# Patient Record
Sex: FEMALE | Race: WHITE | NOT HISPANIC OR LATINO
[De-identification: names, ages, dates, MRNs, and addresses within clinical notes are randomized per-mention and may not be internally consistent; named-entity substitution may affect disease eponyms.]

---

## 2021-02-12 PROBLEM — Z00.00 ENCOUNTER FOR PREVENTIVE HEALTH EXAMINATION: Status: ACTIVE | Noted: 2021-02-12

## 2021-03-03 ENCOUNTER — APPOINTMENT (OUTPATIENT)
Dept: PHYSICAL MEDICINE AND REHAB | Facility: CLINIC | Age: 66
End: 2021-03-03
Payer: MEDICARE

## 2021-03-03 VITALS
HEIGHT: 66.14 IN | SYSTOLIC BLOOD PRESSURE: 125 MMHG | TEMPERATURE: 97.6 F | HEART RATE: 73 BPM | DIASTOLIC BLOOD PRESSURE: 75 MMHG | WEIGHT: 138 LBS | BODY MASS INDEX: 22.18 KG/M2 | RESPIRATION RATE: 18 BRPM

## 2021-03-03 DIAGNOSIS — Z87.39 PERSONAL HISTORY OF OTHER DISEASES OF THE MUSCULOSKELETAL SYSTEM AND CONNECTIVE TISSUE: ICD-10-CM

## 2021-03-03 DIAGNOSIS — Z85.07 PERSONAL HISTORY OF MALIGNANT NEOPLASM OF PANCREAS: ICD-10-CM

## 2021-03-03 PROCEDURE — 99204 OFFICE O/P NEW MOD 45 MIN: CPT

## 2021-03-03 PROCEDURE — 99072 ADDL SUPL MATRL&STAF TM PHE: CPT

## 2021-03-03 RX ORDER — OMEPRAZOLE, SODIUM BICARBONATE 40; 1100 MG/1; MG/1
CAPSULE ORAL
Refills: 0 | Status: ACTIVE | COMMUNITY

## 2021-03-03 RX ORDER — MAGNESIUM 100 MG
100 TABLET ORAL
Refills: 0 | Status: ACTIVE | COMMUNITY

## 2021-03-03 RX ORDER — PITAVASTATIN CALCIUM 4.18 MG/1
TABLET, FILM COATED ORAL
Refills: 0 | Status: ACTIVE | COMMUNITY

## 2021-03-03 NOTE — HISTORY OF PRESENT ILLNESS
[FreeTextEntry1] : Ms. BRISA BOATENG is a very pleasant 66 year female who seen for evaluation of bilateral foot numbness and paresthesias with balance issues  that has been ongoing for 6 years   without any specific injury or inciting event first noted after a left foot/ankle  fracture . 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 The pain is rated as 1/10 during today's visit, and ranges from 0-8/10. The patient's symptoms are aggravated by always the same   and alleviated by medication  . The patient denies any hand pain -well a little if she sleeps on her hands , no weakness, or bowel/bladder dysfunction. The patient has no other complaints at this time.\par she has not fallen recently \par \par \par

## 2021-03-03 NOTE — REVIEW OF SYSTEMS
[Recent Change In Weight] : ~T recent weight change [Muscle Weakness] : muscle weakness [Difficulty Walking] : difficulty walking [Negative] : Heme/Lymph [Lower Ext Edema] : no lower extremity edema

## 2021-03-03 NOTE — PHYSICAL EXAM
[FreeTextEntry1] : PHYSICAL EXAM : OBJECTIVE \par \par GENERAL : Awake ,alert and oriented to time place and person \par HEAD : normocephalic and atraumatic \par NECK : supple ,no tracheal deviation ,no thyroid enlargement noted with swallowing\par EYES : sclera and conjunctiva normal no redness,intact extraocular movements \par ENT  : ears and nose normal in appearance -hearing adequate \par PULMONARY: effort normal. No respiratory distress. breathing regular. No wheezes \par LYMPH : No swelling in limbs, capillary return within normal range \par CVS : warm extremities,no palpitations,not short of breath, no visible jugular venous distention\par PSYCH : mood and affect normal ,good eye contact ,normal attention \par ABDOMEN : no visible distension , \par NEUROLOGICAL:cranial nerves intact,muscle tone normal,gait and balance safe except where noted below \par SKIN : warm and dry No rash detected over specific body areas examined \par MUSCULOSKELETAL: normal muscle bulk, no focal bony tenderness /posture normal except where specified below\par  wide based gait \par left EHL 4/5 \par cannot tandem walk \par wide based \par numb feet \par loss vibration \par ankle ROM full \par

## 2021-03-03 NOTE — ASSESSMENT
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 on history \par \par \par Recommend\par .symptomatic care and support\par  medications cont pain meds consider gabapentin \par  imaging not needed \par \par \par  relative rest and avoidance of painful activity where possible \par  increasing activity as discussed \par  return for EMG\par Information given to patient about EMG and Nerve Conduction Study Examination including  planning, differential diagnosis to rule in /rule out ,duration of the test ,precautions (if patient on blood thinner.has bleeding disorder or  pace maker device etc -still possible to undergo with care), side effects(benign-limited to short term bruising and discomfort/pain)  \par The protocol of temp checks upon arrival ,disinfection procedure of waiting room and the lab explained- reassured. \par All questions answered. \par Patient instructed to book appointment upon conclusion of appointment\par \par Information sheet ' Answers to your Questions on EMG " forwarded to patient to read prior to testing, with further information about training,background and the procedure itself .\par \par referring doctor will be called after EMG NCS to discuss findings and further management directions \par \par Time spent including review of documents /records/decision making /discussion  amounted  > 45 minutes \par she is anxious and has gone thru a lot with the cancer diagnosis and Rx \par wants to get EMG as soon as possible \par was disappointed that could not have same day \par explained covid protocol and AANEM guidelines for current testing

## 2021-03-04 ENCOUNTER — APPOINTMENT (OUTPATIENT)
Dept: PHYSICAL MEDICINE AND REHAB | Facility: CLINIC | Age: 66
End: 2021-03-04

## 2021-03-11 ENCOUNTER — APPOINTMENT (OUTPATIENT)
Dept: PHYSICAL MEDICINE AND REHAB | Facility: CLINIC | Age: 66
End: 2021-03-11
Payer: MEDICARE

## 2021-03-11 VITALS
HEIGHT: 66 IN | WEIGHT: 138 LBS | DIASTOLIC BLOOD PRESSURE: 70 MMHG | BODY MASS INDEX: 22.18 KG/M2 | SYSTOLIC BLOOD PRESSURE: 116 MMHG | HEART RATE: 74 BPM | TEMPERATURE: 98 F | RESPIRATION RATE: 17 BRPM

## 2021-03-11 DIAGNOSIS — R26.89 OTHER ABNORMALITIES OF GAIT AND MOBILITY: ICD-10-CM

## 2021-03-11 DIAGNOSIS — R29.818 OTHER SYMPTOMS AND SIGNS INVOLVING THE NERVOUS SYSTEM: ICD-10-CM

## 2021-03-11 DIAGNOSIS — R20.0 ANESTHESIA OF SKIN: ICD-10-CM

## 2021-03-11 DIAGNOSIS — R29.898 OTHER SYMPTOMS AND SIGNS INVOLVING THE MUSCULOSKELETAL SYSTEM: ICD-10-CM

## 2021-03-11 DIAGNOSIS — G60.8 OTHER HEREDITARY AND IDIOPATHIC NEUROPATHIES: ICD-10-CM

## 2021-03-11 DIAGNOSIS — Z91.81 HISTORY OF FALLING: ICD-10-CM

## 2021-03-11 DIAGNOSIS — R26.9 UNSPECIFIED ABNORMALITIES OF GAIT AND MOBILITY: ICD-10-CM

## 2021-03-11 DIAGNOSIS — G57.93 UNSPECIFIED MONONEUROPATHY OF BILATERAL LOWER LIMBS: ICD-10-CM

## 2021-03-11 PROCEDURE — 99072 ADDL SUPL MATRL&STAF TM PHE: CPT

## 2021-03-11 PROCEDURE — 95913 NRV CNDJ TEST 13/> STUDIES: CPT

## 2021-03-11 PROCEDURE — 95886 MUSC TEST DONE W/N TEST COMP: CPT

## 2023-04-06 ENCOUNTER — APPOINTMENT (OUTPATIENT)
Dept: UROLOGY | Facility: CLINIC | Age: 68
End: 2023-04-06
Payer: MEDICARE

## 2023-04-06 VITALS
SYSTOLIC BLOOD PRESSURE: 102 MMHG | HEIGHT: 66.14 IN | DIASTOLIC BLOOD PRESSURE: 99 MMHG | WEIGHT: 134 LBS | HEART RATE: 75 BPM | TEMPERATURE: 98.1 F | BODY MASS INDEX: 21.53 KG/M2 | OXYGEN SATURATION: 97 %

## 2023-04-06 PROCEDURE — 99204 OFFICE O/P NEW MOD 45 MIN: CPT

## 2023-04-10 ENCOUNTER — NON-APPOINTMENT (OUTPATIENT)
Age: 68
End: 2023-04-10

## 2023-04-13 LAB
APPEARANCE: ABNORMAL
BACTERIA UR CULT: ABNORMAL
BACTERIA: ABNORMAL /HPF
BILIRUBIN URINE: NEGATIVE
BLOOD URINE: ABNORMAL
CAST: 3 /LPF
COLOR: YELLOW
EPITHELIAL CELLS: 0 /HPF
GLUCOSE QUALITATIVE U: NEGATIVE MG/DL
KETONES URINE: NEGATIVE MG/DL
LEUKOCYTE ESTERASE URINE: ABNORMAL
MICROSCOPIC-UA: NORMAL
NITRITE URINE: NEGATIVE
PH URINE: 5
PROTEIN URINE: NORMAL MG/DL
RED BLOOD CELLS URINE: 0 /HPF
SPECIFIC GRAVITY URINE: 1.01
UROBILINOGEN URINE: 0.2 MG/DL
WHITE BLOOD CELLS URINE: 151 /HPF

## 2023-04-16 NOTE — HISTORY OF PRESENT ILLNESS
[FreeTextEntry1] : 69 yo F here for recurrent UTI - 3 times since June\par Per pt, cultures show e.coli\par UTI symptoms - dysuria, no bothersome LUTS\par Most recently had symptoms 2 days ago but resolved on its own yesterday\par Started on abx by PCP but pt never picked it up\par History of pelvic organ prolapse and using pessary - managed by outside urologist (Dr. Laly Doll)\par Pt states dysuria was painful so removed pessary a few days ago (currently at home).\par Per pt, s/p prolapse surgery in 2019 but recurred after 3 days\par Voids 1/day, nocturia 4-5/night\par Drinks 2L of water, 1 cup of coffee, 1cup of black tea\par normal bowel movements

## 2023-04-16 NOTE — ASSESSMENT
[FreeTextEntry1] : 69 yo F with prolapse managed with pessary, recurrent UTI\par \par - PVR = 50ml\par - UA, culture\par - discussed possible etiologies for UTI. Spent extensive period of time discussed behavioral modification including adequate hydration, cutting back on caffeine intake, timed voiding during the day, importance of controlling any diabetes and chronic constipation and how all of these things could potentially increase risk for persistent or recurrent UTI. Emphasized timed voiding, as pt is only voiding once daily\par - Refer to Dr. Preciado for pessary/prolapse management which may be contirbuting to her UTIs\par

## 2023-04-25 ENCOUNTER — APPOINTMENT (OUTPATIENT)
Dept: UROLOGY | Facility: CLINIC | Age: 68
End: 2023-04-25
Payer: MEDICARE

## 2023-04-25 VITALS
SYSTOLIC BLOOD PRESSURE: 117 MMHG | BODY MASS INDEX: 21.53 KG/M2 | TEMPERATURE: 97.4 F | WEIGHT: 134 LBS | HEART RATE: 78 BPM | HEIGHT: 66.14 IN | DIASTOLIC BLOOD PRESSURE: 56 MMHG | OXYGEN SATURATION: 96 %

## 2023-04-25 PROCEDURE — 51798 US URINE CAPACITY MEASURE: CPT

## 2023-04-25 PROCEDURE — 99215 OFFICE O/P EST HI 40 MIN: CPT

## 2023-04-25 RX ORDER — METHENAMINE HIPPURATE 1 G/1
1 TABLET ORAL
Qty: 60 | Refills: 3 | Status: ACTIVE | COMMUNITY
Start: 2023-04-25 | End: 1900-01-01

## 2023-04-25 RX ORDER — ASCORBIC ACID 500 MG
500 TABLET ORAL DAILY
Qty: 30 | Refills: 3 | Status: ACTIVE | COMMUNITY
Start: 2023-04-25 | End: 1900-01-01

## 2023-04-25 NOTE — ASSESSMENT
[FreeTextEntry1] : Ms. BOATENG has seen me today for an evaluation of recurrent urinary tract infections (UTIs).  These have been confirmed on cultures that I do not have to review.  Additionally, she suffers from prolapse that is managed with a pessary and Estrace cream.  She cleans and manages the pessary independently.  Today she feels well without any urinary complaints.  Her PVR is 7, indicating she is emptying well.\par \par Based on her exam and clinical picture, we have discussed that in her age group, these infections are likely due to vaginal atrophy, which poses an increased risk of these infections continuing in the future. In addition, we covered the importance of a non-antibiotic based regimen to control UTI symptoms in order to prevent overtreatment and minimize the risk of antibiotic resistance. Moving forward, our plan to prevent future infections includes: \par \par       -Vaginal estrogen cream - instructions on use, risks, benefits, and alternatives discussed and provided in a handout \par       -Hiprex + Vitamin C\par       -Cranberry supplements \par       -D-Mannose \par       -Stool softeners \par       -Probiotics \par       -Cystoscopy  \par \par In the event of acute UTI symptoms, she has been advised to: \par       -increase fluid intake \par       -use OTC Motrin and Azo for pain control \par       -call my office for a urine culture if symptoms do not improve after 3 days \par \par I will treat based on positive urine culture results only in the presence of new symptoms\par \par I would like to see her back in 1 month for a cystoscopy

## 2023-04-25 NOTE — PHYSICAL EXAM
[General Appearance - Well Developed] : well developed [General Appearance - Well Nourished] : well nourished [FreeTextEntry1] : ambulates with walker

## 2023-04-25 NOTE — HISTORY OF PRESENT ILLNESS
[FreeTextEntry1] : 68F presents for initial evaluation of recurrent UTIs\par    Referred by Dr. Hernandez   \par \par    PMH significant for: pancreatic cancer, neuropathy, arthritis \par    PSH significant for: pancreatic surgery, cholecystectomy, hernia repair \par    Significant meds: livalo, omeprazole    \par \par    Seen by Dr. Hernandez 4/6 for recurrent UTIs and prolapse   Per patient, 3 abx since June 2022   Prolapse history- using pessary with previous MD   Removed pessary due to pain in beginning of April    UCx 4/6: +Ecoli—taking abx prescribed by PCP \par \par Patient reports years-long history of prolapse symptoms\par Reports significant level of distress \par Pelvic Heaviness: yes\par Vaginal Bulge: yes\par Splinting: No\par Previously treated with: treated with sacrospinous ligament fixation?? however 3 days after surgery prolapse recurred and now she has pessary.\par History of Abnormal PAP: none \par Amenable to Hysterectomy: patient doesn't want her uterus out\par \par Patient reports few years history of recurrent UTI that started after her surgery in 2019\par Reports significant level of distress \par Associated with frequency, and burning \par Previously treated with antibiotics last course was on April 10 fo E.coli previously has been treated with nitrofurantoin \par \par Daytime Frequency: 2-3\par Nighttime Frequency: 4-5 \par Patient reports no urgency \par Pads per day: none\par Straining to void: no\par Subjective Incomplete Emptying: yes \par patient had 3 UTI in the past 4 months \par \par Daily Fluid Total: drinks around 2 liters of water \par Daily Caffeine Total: 1 cup of coffee\par  \par patient reports no history of constipation \par Neurologic Hx, Vision or Balance changes: patient use walker for assistance in walking \par

## 2023-05-25 ENCOUNTER — APPOINTMENT (OUTPATIENT)
Dept: UROLOGY | Facility: CLINIC | Age: 68
End: 2023-05-25

## 2023-07-03 ENCOUNTER — APPOINTMENT (OUTPATIENT)
Dept: UROLOGY | Facility: CLINIC | Age: 68
End: 2023-07-03
Payer: MEDICARE

## 2023-07-03 DIAGNOSIS — R35.1 NOCTURIA: ICD-10-CM

## 2023-07-03 DIAGNOSIS — N94.9 UNSPECIFIED CONDITION ASSOCIATED WITH FEMALE GENITAL ORGANS AND MENSTRUAL CYCLE: ICD-10-CM

## 2023-07-03 LAB
BILIRUB UR QL STRIP: NORMAL
CLARITY UR: CLEAR
COLLECTION METHOD: NORMAL
GLUCOSE UR-MCNC: NORMAL
HCG UR QL: 0.2 EU/DL
HGB UR QL STRIP.AUTO: NORMAL
KETONES UR-MCNC: NORMAL
LEUKOCYTE ESTERASE UR QL STRIP: NORMAL
NITRITE UR QL STRIP: NORMAL
PH UR STRIP: 6
PROT UR STRIP-MCNC: NORMAL
SP GR UR STRIP: 1.02

## 2023-07-03 PROCEDURE — 81003 URINALYSIS AUTO W/O SCOPE: CPT | Mod: QW

## 2023-07-03 PROCEDURE — 99215 OFFICE O/P EST HI 40 MIN: CPT

## 2023-07-05 ENCOUNTER — APPOINTMENT (OUTPATIENT)
Dept: UROLOGY | Facility: CLINIC | Age: 68
End: 2023-07-05
Payer: MEDICARE

## 2023-07-05 VITALS
SYSTOLIC BLOOD PRESSURE: 109 MMHG | OXYGEN SATURATION: 97 % | DIASTOLIC BLOOD PRESSURE: 62 MMHG | HEART RATE: 62 BPM | TEMPERATURE: 97.3 F

## 2023-07-05 LAB
APPEARANCE: CLEAR
BACTERIA: ABNORMAL /HPF
BILIRUBIN URINE: NEGATIVE
BLOOD URINE: NEGATIVE
CAST: 1 /LPF
COLOR: YELLOW
EPITHELIAL CELLS: 4 /HPF
GLUCOSE QUALITATIVE U: NEGATIVE MG/DL
KETONES URINE: NEGATIVE MG/DL
LEUKOCYTE ESTERASE URINE: ABNORMAL
MICROSCOPIC-UA: NORMAL
NITRITE URINE: POSITIVE
PH URINE: 6
PROTEIN URINE: NEGATIVE MG/DL
RED BLOOD CELLS URINE: 1 /HPF
SPECIFIC GRAVITY URINE: 1.02
UROBILINOGEN URINE: 0.2 MG/DL
WHITE BLOOD CELLS URINE: 40 /HPF

## 2023-07-05 PROCEDURE — 51798 US URINE CAPACITY MEASURE: CPT

## 2023-07-05 PROCEDURE — 99214 OFFICE O/P EST MOD 30 MIN: CPT

## 2023-07-05 PROCEDURE — 81003 URINALYSIS AUTO W/O SCOPE: CPT | Mod: QW

## 2023-07-05 NOTE — HISTORY OF PRESENT ILLNESS
[FreeTextEntry1] : Language: English/Polish\par Date of First visit: 7/3/2023\par Accompanied by: ***\par Contact info: ***\par Referring Provider/PCP: Brown Samuels\par \par \par \par CC/ Problem List:\par \par ===============================================================================\par FIRST VISIT:\par The patient is a 68 year female who first presents 2023 for prolapse and recurrent UTI's.\par \par She has been on five antibiotics. It is very very difficult to get a history from her. \par She states a Dr. Reynoso did her surgery and her prolapse recurred three days later while she was rolling over in bed and she has been "suffering every since." She saw Dr. Hernandez who sent her to Dr. Preciado but she does not want to see a male. She also may have a uterine issue.\par \par She got her first UTI around . She had her prolapse surgery in 2019.\par She was told that her prolapse has caused her UTI's.\par She denies sensation of prolapse. She has a pessary in. She cleans it once a month. She has been on abx 6x and has only had one positive culture.\par \par She has baseline nocturia 2-5x/night. She drinks 3L of water per night. She denies straining to void. She denies constipation. She is not sexually active. She was told she had fungus but she is not sure where.\par \par When she has a UTI she feels pain when she voids. She does not usually have urgency or frequency. She does not know where her pain is but she feels pain outside. I reviewed all the records she brought in. See below\par \par \par -------------------------------------------------------------------------------------------\par INTERVAL VISITS:\par \par \par ===============================================================================\par \par PMH: Thickened endometrium\par PSH: prolapse surgery in 2019, Hernia x2, Pancreatic cancer, Noni\par POBH: (if applicable) \par FH: \par \par ALL: PCN\par MEDS: Premarin, Probiotic, Dmannose, Emergen-C\par SOC:\par \par \par ROS: Review of Systems is as per HPI unless otherwise denoted below\par \par \par ===============================================================================\par DATA: \par \par LABS:-------------------------------------------------------------------------------------------------------------------\par 2022: No urine tests \par 2023: Urine culture >100K e. coli pan-sens\par 23 sCre 0.84, urine culture negative, UA 2 Epi, Small Le, neg NIT\par 2023: sCre 0.62, UA negative, NIT, Neg LE\par 7/3/2023: UA dip small Le, trace blood lysed\par \par \par RADS:-------------------------------------------------------------------------------------------------------------------\par 2023: Pelvic sono\par Thickened endometrium with cystic changes\par \par \par PATHOLOGY/CYTOLOGY:-------------------------------------------------------------------------------------------\par \par \par \par VOIDING STUDIES: ----------------------------------------------------------------------------------------------------\par 7/3/2023: PVR 15cc\par \par \par STONE STUDIES: (Analysis/LLSA)----------------------------------------------------------------------------------\par \par \par \par PROCEDURES: -----------------------------------------------------------------------------------------------\par \par \par \par \par ===============================================================================\par \par PHYSICAL EXAM:\par \par GEN: AAOx3, NAD, Habitus: normal\par \par BARRIERS to CARE: none\par \par PSYCH: Appropriate Behavior, Affect Congruent\par \par HEENT: AT/NC Trachea midline. \par \par Lungs: No labored breathing.\par \par NEURO: + Movement, all 4 extremities grossly intact without deficits. No tremors.\par \par SKIN: Warm dry. No visible rashes or ulcers\par \par GAIT: Gait antalgic , Stability guarded uses walker\par \par =======================================================================================\par \par \par \par ASSESSMENT and PLAN\par \par The patient is a 68 year female with a history of the following:\par \par 1. Prolapse with Pessary\par I am going to refer her to Dr. Jernigan for prolapse. She started having some of her pain after her POP repair and I think that Dr. rPeciado's plan of cysto or other workup is a good idea.\par \par 2. Atrophic vaginitis with endometrial changes\par I recommended she stop the premarin because of her endometrial changes: if these are benign she can restart it.\par \par 3. Recurrent UTI's\par I don't think she is having r-UTI's. She has only had on positive culture. Her other urine tests have been negative.\par Her symptoms are also not typical of UTI's (outside burning, but frequency/urgency). I recommended she stop cranberry/Vit C for now as these can contributed to dysuria.\par \par -----------------------------------------------------------------------------------------------------\par LABS/TESTS Ordered: UCx, UA\par Meds Ordered: stop premarin, cranberry, Vit C\par Follow up: Dr. Jernigan for prolapse\par -----------------------------------------------------------------------------------------------------\par \par The total amount of time I have personally spent preparing for this visit, reviewing the patient's test results, obtaining external history, ordering tests/medications, documenting clinical information, communicating with and counseling the patient/family and/or caregiver(s), and spent face to face with the patient explaining the above was  40 minutes.\par \par Thank you for allowing me to assist in the care of your patient. Should you have any questions please do not hesitate to reach out to me.\par \par \par Magdalene Schreiber MD\par Associate \par Department of Urology\par [facility]\par Phone: [phone]\par Fax: [phone]\par \par [address]\par [facility] [address]\par

## 2023-07-06 NOTE — HISTORY OF PRESENT ILLNESS
[FreeTextEntry1] : Pt is a 67 yo with recurrent UTIs and prolapse sent by Dr. Enriquez.   Her chief complaint is burning after she urinates  and pain at the clitoris.  She has recently stopped using transvaginal estrogen as advised by Dr. Schreiber as she has a thickened endometrium.   She is having a repeat transvaginal US next week and will send the report to me.  I agree with holding on estrogen.  She wants to take D Mannose to help with her symptoms- that is fine. \par \par PVR 55cc, (done to rule out incomplete emptying)\par \par From Dr. Schreiber note: \par Full Hx.\par \par She has been on five antibiotics. It is very very difficult to get a history from her. \par She states a Dr. Reynoso did her surgery and her prolapse recurred three days later while she was rolling over in bed and she has been "suffering every since." She saw Dr. Hernandez who sent her to Dr. Preciado but she does not want to see a male. She also may have a uterine issue.\par \par She got her first UTI around . She had her prolapse surgery in 2019.\par She was told that her prolapse has caused her UTI's.\par She denies sensation of prolapse. She has a pessary in. She cleans it once a month. She has been on abx 6x and has only had one positive culture.\par \par She has baseline nocturia 2-5x/night. She drinks 3L of water per night. She denies straining to void. She denies constipation. She is not sexually active. She was told she had fungus but she is not sure where.\par \par When she has a UTI she feels pain when she voids. She does not usually have urgency or frequency. She does not know where her pain is but she feels pain outside. I reviewed all the records she brought in. See below\par \par \par -------------------------------------------------------------------------------------------\par INTERVAL VISITS:\par \par \par ===============================================================================\par \par PMH: Thickened endometrium\par PSH: prolapse surgery in 2019, Hernia x2, Pancreatic cancer, Noni\par POBH: (if applicable) \par FH: \par \par ALL: PCN\par MEDS: Premarin, Probiotic, Dmannose, Emergen-C\par SOC:\par \par \par ROS: Review of Systems is as per HPI unless otherwise denoted below\par \par \par ===============================================================================\par DATA: \par \par LABS:-------------------------------------------------------------------------------------------------------------------\par 2022: No urine tests \par 2023: Urine culture >100K e. coli pan-sens\par 23 sCre 0.84, urine culture negative, UA 2 Epi, Small Le, neg NIT\par 2023: sCre 0.62, UA negative, NIT, Neg LE\par 7/3/2023: UA dip small Le, trace blood lysed\par \par \par RADS:-------------------------------------------------------------------------------------------------------------------\par 2023: Pelvic sono\par Thickened endometrium with cystic changes\par \par \par PATHOLOGY/CYTOLOGY:-------------------------------------------------------------------------------------------\par \par \par \par VOIDING STUDIES: ----------------------------------------------------------------------------------------------------\par 7/3/2023: PVR 15cc\par \par \par STONE STUDIES: (Analysis/LLSA)----------------------------------------------------------------------------------\par \par \par \par PROCEDURES: -----------------------------------------------------------------------------------------------\par \par \par \par \par ===============================================================================\par \par PHYSICAL EXAM:\par \par GEN: AAOx3, NAD, Habitus: normal\par \par BARRIERS to CARE: none\par \par PSYCH: Appropriate Behavior, Affect Congruent\par \par HEENT: AT/NC Trachea midline. \par \par Lungs: No labored breathing.\par \par NEURO: + Movement, all 4 extremities grossly intact without deficits. No tremors.\par \par SKIN: Warm dry. No visible rashes or ulcers\par \par GAIT: Gait antalgic , Stability guarded uses walker\par \par =======================================================================================\par \par \par \par ASSESSMENT and PLAN\par \par The patient is a 68 year female with a history of prolapse and "recurrent UTI's".  \par \par Her culture from her initial visit with Dr. Schreiber should be back in 2 days and if positive, treated.  She should undergo repeat transvaginal US and follow up with me afterward for discussion regarding the results and further management of her prolapse. \par \par Pt to schedule televisit to review UC results- this can be done with me or Dr. Enriquez. \par

## 2023-07-07 ENCOUNTER — NON-APPOINTMENT (OUTPATIENT)
Age: 68
End: 2023-07-07

## 2023-07-07 LAB — BACTERIA UR CULT: ABNORMAL

## 2023-07-07 RX ORDER — CIPROFLOXACIN HYDROCHLORIDE 250 MG/1
250 TABLET, FILM COATED ORAL
Qty: 10 | Refills: 0 | Status: DISCONTINUED | COMMUNITY
Start: 2023-07-07 | End: 2023-07-07

## 2023-07-07 RX ORDER — NITROFURANTOIN (MONOHYDRATE/MACROCRYSTALS) 25; 75 MG/1; MG/1
100 CAPSULE ORAL
Qty: 14 | Refills: 0 | Status: ACTIVE | COMMUNITY
Start: 2023-07-07 | End: 1900-01-01

## 2023-07-12 LAB
APPEARANCE: ABNORMAL
BACTERIA: ABNORMAL /HPF
BILIRUB UR QL STRIP: NORMAL
BILIRUBIN URINE: NEGATIVE
BLOOD URINE: ABNORMAL
CAST: 1 /LPF
CLARITY UR: CLEAR
COLLECTION METHOD: NORMAL
COLOR: YELLOW
EPITHELIAL CELLS: 1 /HPF
GLUCOSE QUALITATIVE U: NEGATIVE MG/DL
GLUCOSE UR-MCNC: NORMAL
HCG UR QL: 0.2 EU/DL
HGB UR QL STRIP.AUTO: NORMAL
KETONES UR-MCNC: NORMAL
KETONES URINE: NEGATIVE MG/DL
LEUKOCYTE ESTERASE UR QL STRIP: NORMAL
LEUKOCYTE ESTERASE URINE: ABNORMAL
MICROSCOPIC-UA: NORMAL
NITRITE UR QL STRIP: POSITIVE
NITRITE URINE: NEGATIVE
PH UR STRIP: 7
PH URINE: 7
PROT UR STRIP-MCNC: NORMAL
PROTEIN URINE: 30 MG/DL
RED BLOOD CELLS URINE: 2 /HPF
REVIEW: NORMAL
SP GR UR STRIP: 1.02
SPECIFIC GRAVITY URINE: 1.02
UROBILINOGEN URINE: 0.2 MG/DL
WHITE BLOOD CELLS URINE: >998 /HPF

## 2023-08-01 ENCOUNTER — NON-APPOINTMENT (OUTPATIENT)
Age: 68
End: 2023-08-01

## 2023-08-01 RX ORDER — SULFAMETHOXAZOLE AND TRIMETHOPRIM 800; 160 MG/1; MG/1
800-160 TABLET ORAL
Qty: 10 | Refills: 0 | Status: DISCONTINUED | COMMUNITY
Start: 2023-08-01 | End: 2023-08-01

## 2023-08-08 ENCOUNTER — APPOINTMENT (OUTPATIENT)
Dept: UROLOGY | Facility: CLINIC | Age: 68
End: 2023-08-08
Payer: MEDICARE

## 2023-08-08 VITALS
TEMPERATURE: 97.2 F | SYSTOLIC BLOOD PRESSURE: 112 MMHG | DIASTOLIC BLOOD PRESSURE: 65 MMHG | HEART RATE: 64 BPM | OXYGEN SATURATION: 95 %

## 2023-08-08 LAB
BILIRUB UR QL STRIP: NORMAL
CLARITY UR: CLEAR
COLLECTION METHOD: NORMAL
GLUCOSE UR-MCNC: NORMAL
HCG UR QL: 0.2 EU/DL
HGB UR QL STRIP.AUTO: NORMAL
KETONES UR-MCNC: NORMAL
LEUKOCYTE ESTERASE UR QL STRIP: NORMAL
NITRITE UR QL STRIP: NORMAL
PH UR STRIP: 5.5
PROT UR STRIP-MCNC: NORMAL
SP GR UR STRIP: 1.03

## 2023-08-08 PROCEDURE — 81003 URINALYSIS AUTO W/O SCOPE: CPT | Mod: QW

## 2023-08-08 PROCEDURE — 99214 OFFICE O/P EST MOD 30 MIN: CPT | Mod: 25

## 2023-08-10 LAB — BACTERIA UR CULT: NORMAL

## 2023-08-10 NOTE — ASSESSMENT
[FreeTextEntry1] : I discussed the findings and options with Ms. BRISA BOATENG in detail.   Regarding her recurrent urinary tract infections, I reviewed the generic prevention recommendations with Ms. BOATENG , including wiping front to back and increasing her fluid intake. She will incorporate these and understands that she should have a urine culture prior to being treated with any courses of antibiotics.   For now, she will finish the course of Abx as prescribed by her PCP. She will then repeat a urine culture and I have provided Ucx for her to drop a sample to a lab.   A renal ultrasound was ordered today to rule out a renal stone in the setting of recurrent infections.  I have also described cystoscopy in the setting of recurrent UTI. The procedure was discussed in great detail with their risks and benefits.  She will present to GYN for consultation regarding possible endometrium biopsy in the setting of abnormal transvaginal US from 7/10/23, (demonstrated 0.6cm endometrium thickness, abnormally heterogeneous with cystic heterogeneity).  She will plan to return in office for cystoscopy.  Patient expressed understanding.

## 2023-08-10 NOTE — HISTORY OF PRESENT ILLNESS
[FreeTextEntry1] : 2023 ---Ms. BRISA BOATENG comes in today for her Urologic follow up. Pt is a 67 yo F with hx recurrent UTIs, vaginal prolapse and a thickened endometrium that has not yet been appropriately evaluated.   She presents with HHA. Today she reports symptoms of a UTI.  Pt had a recent positive culture on  and she was placed on a 2 week course of abx by her PCP- the last dose is on 8/10 as per pt.   Transvaginal US: 7/10/23-- Endometrium: 0.6cm thickness, abnormally heterogeneous with cystic heterogeneity.  Rec tissue sampling.   Udip: 08/10/2023 ---trace leuks   23-- Pt is a 67 yo with recurrent UTIs and prolapse sent by Dr. Enriquez.   Her chief complaint is burning after she urinates  and pain at the clitoris.  She has recently stopped using transvaginal estrogen as advised by Dr. Schreiber as she has a thickened endometrium.   She is having a repeat transvaginal US next week and will send the report to me.  I agree with holding on estrogen.  She wants to take D Mannose to help with her symptoms- that is fine.   PVR 55cc, (done to rule out incomplete emptying)  From Dr. Schreiber note:  Full Hx.  She has been on five antibiotics. It is very very difficult to get a history from her.  She states a Dr. Reynoso did her surgery and her prolapse recurred three days later while she was rolling over in bed and she has been "suffering every since." She saw Dr. Hernandez who sent her to Dr. Preciado but she does not want to see a male. She also may have a uterine issue.  She got her first UTI around . She had her prolapse surgery in 2019. She was told that her prolapse has caused her UTI's. She denies sensation of prolapse. She has a pessary in. She cleans it once a month. She has been on abx 6x and has only had one positive culture.  She has baseline nocturia 2-5x/night. She drinks 3L of water per night. She denies straining to void. She denies constipation. She is not sexually active. She was told she had fungus but she is not sure where.  When she has a UTI she feels pain when she voids. She does not usually have urgency or frequency. She does not know where her pain is but she feels pain outside. I reviewed all the records she brought in. See below   ------------------------------------------------------------------------------------------- INTERVAL VISITS:   ===============================================================================  PMH: Thickened endometrium PSH: prolapse surgery in 2019, Hernia x2, Pancreatic cancer, Noni POBH: (if applicable)  FH:   ALL: PCN MEDS: Premarin, Probiotic, Dmannose, Emergen-C SOC:   ROS: Review of Systems is as per HPI unless otherwise denoted below   =============================================================================== DATA:   LABS:------------------------------------------------------------------------------------------------------------------- 2022: No urine tests  2023: Urine culture >100K e. coli pan-sens 23 sCre 0.84, urine culture negative, UA 2 Epi, Small Le, neg NIT 2023: sCre 0.62, UA negative, NIT, Neg LE 7/3/2023: UA dip small Le, trace blood lysed   RADS:------------------------------------------------------------------------------------------------------------------- 2023: Pelvic sono Thickened endometrium with cystic changes   PATHOLOGY/CYTOLOGY:-------------------------------------------------------------------------------------------    VOIDING STUDIES: ---------------------------------------------------------------------------------------------------- 7/3/2023: PVR 15cc   STONE STUDIES: (Analysis/LLSA)----------------------------------------------------------------------------------    PROCEDURES: -----------------------------------------------------------------------------------------------     ===============================================================================  PHYSICAL EXAM:  GEN: AAOx3, NAD, Habitus: normal  BARRIERS to CARE: none  PSYCH: Appropriate Behavior, Affect Congruent  HEENT: AT/NC Trachea midline.   Lungs: No labored breathing.  NEURO: + Movement, all 4 extremities grossly intact without deficits. No tremors.  SKIN: Warm dry. No visible rashes or ulcers  GAIT: Gait antalgic , Stability guarded uses walker

## 2023-08-10 NOTE — ADDENDUM
[FreeTextEntry1] : A portion of this note was written by [Hung Brothers] on 08/10/2023 acting as a scribe for Dr. Jernigan.   I have personally reviewed the chart and agree that the record accurately reflects my personal performance of the history, physical exam, assessment, and plan.

## 2023-08-14 ENCOUNTER — APPOINTMENT (OUTPATIENT)
Dept: UROLOGY | Facility: CLINIC | Age: 68
End: 2023-08-14
Payer: MEDICARE

## 2023-08-14 VITALS
WEIGHT: 135 LBS | TEMPERATURE: 97.9 F | DIASTOLIC BLOOD PRESSURE: 62 MMHG | BODY MASS INDEX: 21.69 KG/M2 | OXYGEN SATURATION: 96 % | SYSTOLIC BLOOD PRESSURE: 119 MMHG | HEIGHT: 66 IN | HEART RATE: 76 BPM

## 2023-08-14 DIAGNOSIS — N81.9 FEMALE GENITAL PROLAPSE, UNSPECIFIED: ICD-10-CM

## 2023-08-14 DIAGNOSIS — N39.0 URINARY TRACT INFECTION, SITE NOT SPECIFIED: ICD-10-CM

## 2023-08-14 PROCEDURE — 52000 CYSTOURETHROSCOPY: CPT
